# Patient Record
Sex: MALE | Race: WHITE | NOT HISPANIC OR LATINO | Employment: OTHER | URBAN - NONMETROPOLITAN AREA
[De-identification: names, ages, dates, MRNs, and addresses within clinical notes are randomized per-mention and may not be internally consistent; named-entity substitution may affect disease eponyms.]

---

## 2023-10-13 ENCOUNTER — TELEPHONE (OUTPATIENT)
Dept: GASTROENTEROLOGY | Facility: CLINIC | Age: 73
End: 2023-10-13

## 2023-10-13 NOTE — TELEPHONE ENCOUNTER
Hub staff attempted to follow warm transfer process and was unsuccessful     Caller: Endy Corbin    Relationship to patient: Self    Best call back number: [phone]    Patient is needing: PT STATES HE CALLED TO GET SCHEDULED, WAS TRANSFERRED TO A PHONE THAT NO ONE ANSWERED, HE WAS DIRECTED TO LVM. PT DOES NOT WISH TO LEAVE ANOTHER VM HE LEFT A VM 2 WKS AGO AND NEVER REC'D A RETURN CALL. PLEASE CONTACT PT TO SCHEDULE. PT STATES IF HE CANNOT RECEIVE A CALL BACK TO SCHEDULE TODAY, HE WILL FIND ANOTHER

## 2023-10-25 ENCOUNTER — ANESTHESIA (OUTPATIENT)
Dept: GASTROENTEROLOGY | Facility: HOSPITAL | Age: 73
End: 2023-10-25
Payer: MEDICARE

## 2023-10-25 ENCOUNTER — HOSPITAL ENCOUNTER (OUTPATIENT)
Facility: HOSPITAL | Age: 73
Setting detail: HOSPITAL OUTPATIENT SURGERY
Discharge: HOME | End: 2023-10-25
Attending: INTERNAL MEDICINE | Admitting: INTERNAL MEDICINE
Payer: MEDICARE

## 2023-10-25 ENCOUNTER — ANESTHESIA EVENT (OUTPATIENT)
Dept: GASTROENTEROLOGY | Facility: HOSPITAL | Age: 73
End: 2023-10-25
Payer: MEDICARE

## 2023-10-25 VITALS
WEIGHT: 252 LBS | HEART RATE: 66 BPM | BODY MASS INDEX: 39.55 KG/M2 | DIASTOLIC BLOOD PRESSURE: 82 MMHG | TEMPERATURE: 97.7 F | HEIGHT: 67 IN | RESPIRATION RATE: 20 BRPM | OXYGEN SATURATION: 95 % | SYSTOLIC BLOOD PRESSURE: 151 MMHG

## 2023-10-25 DIAGNOSIS — Z12.11 ENCOUNTER FOR SCREENING FOR MALIGNANT NEOPLASM OF COLON: ICD-10-CM

## 2023-10-25 LAB — GLUCOSE BLDC GLUCOMTR-MCNC: 134 MG/DL (ref 70–130)

## 2023-10-25 PROCEDURE — 25810000003 SODIUM CHLORIDE 0.9 % SOLUTION: Performed by: ANESTHESIOLOGY

## 2023-10-25 PROCEDURE — 45380 COLONOSCOPY AND BIOPSY: CPT | Performed by: INTERNAL MEDICINE

## 2023-10-25 PROCEDURE — 25010000002 PROPOFOL 10 MG/ML EMULSION

## 2023-10-25 PROCEDURE — 88305 TISSUE EXAM BY PATHOLOGIST: CPT | Performed by: INTERNAL MEDICINE

## 2023-10-25 PROCEDURE — 82948 REAGENT STRIP/BLOOD GLUCOSE: CPT

## 2023-10-25 PROCEDURE — 45385 COLONOSCOPY W/LESION REMOVAL: CPT | Performed by: INTERNAL MEDICINE

## 2023-10-25 RX ORDER — PROPOFOL 10 MG/ML
VIAL (ML) INTRAVENOUS AS NEEDED
Status: DISCONTINUED | OUTPATIENT
Start: 2023-10-25 | End: 2023-10-25 | Stop reason: SURG

## 2023-10-25 RX ORDER — SODIUM CHLORIDE 9 MG/ML
500 INJECTION, SOLUTION INTRAVENOUS CONTINUOUS PRN
Status: DISCONTINUED | OUTPATIENT
Start: 2023-10-25 | End: 2023-10-25 | Stop reason: HOSPADM

## 2023-10-25 RX ORDER — SODIUM CHLORIDE 0.9 % (FLUSH) 0.9 %
10 SYRINGE (ML) INJECTION EVERY 12 HOURS SCHEDULED
Status: CANCELLED | OUTPATIENT
Start: 2023-10-25

## 2023-10-25 RX ORDER — SODIUM CHLORIDE 0.9 % (FLUSH) 0.9 %
10 SYRINGE (ML) INJECTION AS NEEDED
Status: CANCELLED | OUTPATIENT
Start: 2023-10-25

## 2023-10-25 RX ORDER — LIDOCAINE HYDROCHLORIDE 20 MG/ML
INJECTION, SOLUTION EPIDURAL; INFILTRATION; INTRACAUDAL; PERINEURAL AS NEEDED
Status: DISCONTINUED | OUTPATIENT
Start: 2023-10-25 | End: 2023-10-25 | Stop reason: SURG

## 2023-10-25 RX ORDER — SODIUM CHLORIDE 9 MG/ML
40 INJECTION, SOLUTION INTRAVENOUS AS NEEDED
Status: CANCELLED | OUTPATIENT
Start: 2023-10-25

## 2023-10-25 RX ORDER — SODIUM CHLORIDE 9 MG/ML
100 INJECTION, SOLUTION INTRAVENOUS CONTINUOUS
Status: CANCELLED | OUTPATIENT
Start: 2023-10-25

## 2023-10-25 RX ADMIN — SODIUM CHLORIDE 500 ML: 9 INJECTION, SOLUTION INTRAVENOUS at 08:05

## 2023-10-25 RX ADMIN — PROPOFOL INJECTABLE EMULSION 250 MG: 10 INJECTION, EMULSION INTRAVENOUS at 08:54

## 2023-10-25 RX ADMIN — LIDOCAINE HYDROCHLORIDE 100 MG: 20 INJECTION, SOLUTION EPIDURAL; INFILTRATION; INTRACAUDAL; PERINEURAL at 08:54

## 2023-10-25 NOTE — ANESTHESIA POSTPROCEDURE EVALUATION
Patient: Amor Corbin    Procedure Summary       Date: 10/25/23 Room / Location: [facility] ENDOSCOPY 4 / [facility] ENDOSCOPY    Anesthesia Start: 0847 Anesthesia Stop:     Procedure: COLONOSCOPY WITH ANESTHESIA Diagnosis:       Encounter for screening for malignant neoplasm of colon      (Encounter for screening for malignant neoplasm of colon [Z12.11])    Surgeons: Brian Bray DO Provider: Yordan Tarango CRNA    Anesthesia Type: MAC ASA Status: 3            Anesthesia Type: MAC    Vitals  Vitals Value Taken Time   BP     Temp     Pulse 73 10/25/23 0914   Resp     SpO2 90 % 10/25/23 0914   Vitals shown include unfiled device data.        Post Anesthesia Care and Evaluation    Patient location during evaluation: PHASE II  Patient participation: complete - patient participated  Level of consciousness: awake and alert  Pain score: 0    Airway patency: patent  Anesthetic complications: No anesthetic complications  PONV Status: none  Cardiovascular status: acceptable  Respiratory status: acceptable  Hydration status: acceptable  No anesthesia care post op

## 2023-10-25 NOTE — H&P
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 His smoking use included cigarettes. He has never used smokeless tobacco. He reports that he does not drink alcohol and does not use drugs.    Medications:   Prior to Admission medications    Medication Sig Start Date End Date Taking? Authorizing Provider   ASPIRIN PO Take 81 mg by mouth Daily.  Patient not taking: Reported on 8/24/2023    Yohannes Robbins MD   atorvastatin (LIPITOR) 20 MG tablet Take 20 mg by mouth Every Night.  Patient not taking: Reported on 8/24/2023    Yohannes Robbins MD   atorvastatin (LIPITOR) 20 MG tablet Take 2 tablets by mouth Daily.    Yohannes Robbins MD   docusate sodium (COLACE) 100 MG capsule Take 1 capsule by mouth 2 (Two) Times a Day.    Yohannes Robbins MD   Fiber 500 MG capsule Take  by mouth.    Yohannes Robbins MD   lisinopril (PRINIVIL,ZESTRIL) 10 MG tablet Take 1 tablet by mouth Every Night.    Yohannes Robbins MD   metFORMIN (GLUCOPHAGE) 500 MG tablet Take 1 tablet by mouth 2 (Two) Times a Day With Meals.    Yohannes Robbins MD   Probiotic Product (Risaquad-2) capsule capsule Take 1 capsule by mouth Daily.    Yohannes Robbins MD   SITagliptin (JANUVIA) 50 MG tablet Take 1 tablet by mouth Daily.    Yohannes Robbins MD       Allergies:  Penicillins    ROS:    General:  "Weight stable  Resp: No SOA  Cardiovascular: No CP    Objective     Blood pressure 166/81, pulse 62, temperature 97.7 °F (36.5 °C), temperature source Temporal, resp. rate 20, height 170.2 cm (67\"), weight 114 kg (252 lb), SpO2 96%.    Physical Exam   Constitutional: Pt is oriented to person, place, and in no distress.   HENT: Mouth/Throat: Oropharynx is clear.   Cardiovascular: Normal rate, regular rhythm.    Pulmonary/Chest: Effort normal. No respiratory distress. No  wheezes.   Abdominal: Soft. Non-distended.  Skin: Skin is warm and dry.   Psychiatric: Mood, memory, affect and judgment appear normal.     Assessment & Plan     Diagnosis:  Screening    Anticipated Surgical Procedure:  C-scope    The risks, benefits, and alternatives of this procedure have been discussed with the patient or the responsible party- the patient understands and agrees to proceed.        "

## 2023-10-25 NOTE — ANESTHESIA PREPROCEDURE EVALUATION
Anesthesia Evaluation     Patient summary reviewed   no history of anesthetic complications:   NPO Solid Status: > 6 hours             Airway   Mallampati: II  Dental      Pulmonary    (-) sleep apnea, not a smoker, no home oxygen  Cardiovascular   Exercise tolerance: good (4-7 METS)    (+) hypertension  (-) past MI, cardiac stents, CABG      Neuro/Psych  (-) seizures, CVA  GI/Hepatic/Renal/Endo    (+) morbid obesity, diabetes mellitus    Musculoskeletal     Abdominal   (+) obese   Substance History      OB/GYN          Other                          Anesthesia Plan    ASA 3     MAC     intravenous induction     Anesthetic plan, risks, benefits, and alternatives have been provided, discussed and informed consent has been obtained with: patient.        CODE STATUS:

## 2023-10-26 LAB
CYTO UR: NORMAL
LAB AP CASE REPORT: NORMAL
Lab: NORMAL
PATH REPORT.FINAL DX SPEC: NORMAL
PATH REPORT.GROSS SPEC: NORMAL

## 2023-10-27 ENCOUNTER — TELEPHONE (OUTPATIENT)
Dept: GASTROENTEROLOGY | Facility: CLINIC | Age: 73
End: 2023-10-27
Payer: MEDICARE

## 2023-10-27 NOTE — TELEPHONE ENCOUNTER
----- Message from Brian Bray DO sent at 10/27/2023  6:39 AM CDT -----  Can u tell him I would like to go over his polyp bx results with him in the office next week  Wed at 12:30   Would be fine    ----- Message -----  From: Lab, Background User  Sent: 10/26/2023   3:35 PM CDT  To: Brian Bray DO

## 2023-11-01 ENCOUNTER — OFFICE VISIT (OUTPATIENT)
Dept: GASTROENTEROLOGY | Facility: CLINIC | Age: 73
End: 2023-11-01
Payer: MEDICARE

## 2023-11-01 VITALS
OXYGEN SATURATION: 97 % | TEMPERATURE: 97 F | DIASTOLIC BLOOD PRESSURE: 84 MMHG | WEIGHT: 258 LBS | HEIGHT: 68 IN | HEART RATE: 70 BPM | SYSTOLIC BLOOD PRESSURE: 148 MMHG | BODY MASS INDEX: 39.1 KG/M2

## 2023-11-01 DIAGNOSIS — D12.6 ADENOMATOUS POLYP OF COLON, UNSPECIFIED PART OF COLON: Primary | ICD-10-CM

## 2023-11-01 NOTE — H&P (VIEW-ONLY)
Chief Complaint   Patient presents with    Colonoscopy     10-25-23 colon here for biopsy results       PCP: Elie Blackman MD  REFER: No ref. provider found    Subjective     HPI           Discussed the path results with the pt about his prob Lipoma at 85 cm which showed serrated adenoma   We discussed the path results and he is willing to proceed with removal      Past Medical History:   Diagnosis Date    Diabetes mellitus     Diverticulitis     2022    Hyperlipidemia     Hypertension        Past Surgical History:   Procedure Laterality Date    COLONOSCOPY N/A 10/25/2023    Procedure: COLONOSCOPY WITH ANESTHESIA;  Surgeon: Brian Bray DO;  Location: [facility] ENDOSCOPY;  Service: Gastroenterology;  Laterality: N/A;  preop: screening  post op: diverticulosis; polyp  PCP: Elie Blackman MD    OTHER SURGICAL HISTORY      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      docusate sodium (COLACE) 100 MG capsule Take 1 capsule by mouth 2 (Two) Times a Day.      Fiber 500 MG capsule Take  by mouth.      lisinopril (PRINIVIL,ZESTRIL) 10 MG tablet Take 1 tablet by mouth Every Night.      metFORMIN (GLUCOPHAGE) 500 MG tablet Take 1 tablet by mouth 2 (Two) Times a Day With Meals.      Probiotic Product (Risaquad-2) capsule capsule Take 1 capsule by mouth Daily.      SITagliptin (JANUVIA) 50 MG tablet Take 1 tablet by mouth Daily.         Allergies   Allergen Reactions    Penicillins Hives       Social History     Socioeconomic History    Marital status:    Tobacco Use    Smoking status: Former     Types: Cigarettes    Smokeless tobacco: Never    Tobacco comments:     Quit smoking over 40 years ago   Vaping Use    Vaping Use: Never used  "  Substance and Sexual Activity    Alcohol use: Never    Drug use: Never       Review of Systems   Constitutional:  Negative for fever and unexpected weight change.   HENT:  Negative for trouble swallowing.    Respiratory:  Negative for shortness of breath.    Cardiovascular:  Negative for chest pain.   Gastrointestinal:  Negative for abdominal pain and anal bleeding.       Objective     Vitals:    11/01/23 1207   BP: 148/84   Pulse: 70   Temp: 97 °F (36.1 °C)   SpO2: 97%   Weight: 117 kg (258 lb)   Height: 172.7 cm (68\")     Body mass index is 39.23 kg/m².    Physical Exam  Constitutional:       Appearance: Normal appearance. He is well-developed.   Eyes:      General: No scleral icterus.  Cardiovascular:      Heart sounds: Normal heart sounds. No murmur heard.  Pulmonary:      Effort: Pulmonary effort is normal.   Abdominal:      General: Bowel sounds are normal. There is no distension.      Palpations: Abdomen is soft.      Tenderness: There is no abdominal tenderness. There is no guarding.   Skin:     General: Skin is warm and dry.      Coloration: Skin is not jaundiced.   Neurological:      Mental Status: He is alert.   Psychiatric:         Behavior: Behavior is cooperative.         Imaging Results (Most Recent)       None            Body mass index is 39.23 kg/m².    Assessment & Plan     Diagnoses and all orders for this visit:    1. Adenomatous polyp of colon, unspecified part of colon (Primary)  -     Case Request; Standing  -     Implement Anesthesia Orders Day of Procedure; Standing  -     Obtain Informed Consent; Standing  -     Case Request    Other orders  -     polyethylene glycol (GoLYTELY) 236 g solution; Take 3,785 mL by mouth 1 (One) Time for 1 dose. Take as directed  Dispense: 3350 mL; Refill: 0        COLONOSCOPY WITH ANESTHESIA (N/A)        Advised pt to stop use of NSAIDs, Fish Oil, and MV 5 days prior to procedure, per Dr Bray protocol.  Tylenol based products are ok to take.  Pt verbalized " understanding.        Brian Bray,   11/01/23          There are no Patient Instructions on file for this visit.

## 2023-11-01 NOTE — PROGRESS NOTES
Chief Complaint   Patient presents with    Colonoscopy     10-25-23 colon here for biopsy results       PCP: Elie Blackman MD  REFER: No ref. provider found    Subjective     HPI           Discussed the path results with the pt about his prob Lipoma at 85 cm which showed serrated adenoma   We discussed the path results and he is willing to proceed with removal      Past Medical History:   Diagnosis Date    Diabetes mellitus     Diverticulitis     2022    Hyperlipidemia     Hypertension        Past Surgical History:   Procedure Laterality Date    COLONOSCOPY N/A 10/25/2023    Procedure: COLONOSCOPY WITH ANESTHESIA;  Surgeon: Brian Bray DO;  Location: [facility] ENDOSCOPY;  Service: Gastroenterology;  Laterality: N/A;  preop: screening  post op: diverticulosis; polyp  PCP: Elie Blackman MD    OTHER SURGICAL HISTORY      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      docusate sodium (COLACE) 100 MG capsule Take 1 capsule by mouth 2 (Two) Times a Day.      Fiber 500 MG capsule Take  by mouth.      lisinopril (PRINIVIL,ZESTRIL) 10 MG tablet Take 1 tablet by mouth Every Night.      metFORMIN (GLUCOPHAGE) 500 MG tablet Take 1 tablet by mouth 2 (Two) Times a Day With Meals.      Probiotic Product (Risaquad-2) capsule capsule Take 1 capsule by mouth Daily.      SITagliptin (JANUVIA) 50 MG tablet Take 1 tablet by mouth Daily.         Allergies   Allergen Reactions    Penicillins Hives       Social History     Socioeconomic History    Marital status:    Tobacco Use    Smoking status: Former     Types: Cigarettes    Smokeless tobacco: Never    Tobacco comments:     Quit smoking over 40 years ago   Vaping Use    Vaping Use: Never used  "  Substance and Sexual Activity    Alcohol use: Never    Drug use: Never       Review of Systems   Constitutional:  Negative for fever and unexpected weight change.   HENT:  Negative for trouble swallowing.    Respiratory:  Negative for shortness of breath.    Cardiovascular:  Negative for chest pain.   Gastrointestinal:  Negative for abdominal pain and anal bleeding.       Objective     Vitals:    11/01/23 1207   BP: 148/84   Pulse: 70   Temp: 97 °F (36.1 °C)   SpO2: 97%   Weight: 117 kg (258 lb)   Height: 172.7 cm (68\")     Body mass index is 39.23 kg/m².    Physical Exam  Constitutional:       Appearance: Normal appearance. He is well-developed.   Eyes:      General: No scleral icterus.  Cardiovascular:      Heart sounds: Normal heart sounds. No murmur heard.  Pulmonary:      Effort: Pulmonary effort is normal.   Abdominal:      General: Bowel sounds are normal. There is no distension.      Palpations: Abdomen is soft.      Tenderness: There is no abdominal tenderness. There is no guarding.   Skin:     General: Skin is warm and dry.      Coloration: Skin is not jaundiced.   Neurological:      Mental Status: He is alert.   Psychiatric:         Behavior: Behavior is cooperative.         Imaging Results (Most Recent)       None            Body mass index is 39.23 kg/m².    Assessment & Plan     Diagnoses and all orders for this visit:    1. Adenomatous polyp of colon, unspecified part of colon (Primary)  -     Case Request; Standing  -     Implement Anesthesia Orders Day of Procedure; Standing  -     Obtain Informed Consent; Standing  -     Case Request    Other orders  -     polyethylene glycol (GoLYTELY) 236 g solution; Take 3,785 mL by mouth 1 (One) Time for 1 dose. Take as directed  Dispense: 3350 mL; Refill: 0        COLONOSCOPY WITH ANESTHESIA (N/A)        Advised pt to stop use of NSAIDs, Fish Oil, and MV 5 days prior to procedure, per Dr Bray protocol.  Tylenol based products are ok to take.  Pt verbalized " understanding.        Brian Bray,   11/01/23          There are no Patient Instructions on file for this visit.

## 2023-11-03 ENCOUNTER — TELEPHONE (OUTPATIENT)
Dept: GASTROENTEROLOGY | Facility: CLINIC | Age: 73
End: 2023-11-03
Payer: MEDICARE

## 2023-11-06 ENCOUNTER — ANESTHESIA EVENT (OUTPATIENT)
Dept: GASTROENTEROLOGY | Facility: HOSPITAL | Age: 73
End: 2023-11-06
Payer: MEDICARE

## 2023-11-06 ENCOUNTER — ANESTHESIA (OUTPATIENT)
Dept: GASTROENTEROLOGY | Facility: HOSPITAL | Age: 73
End: 2023-11-06
Payer: MEDICARE

## 2023-11-06 ENCOUNTER — HOSPITAL ENCOUNTER (OUTPATIENT)
Facility: HOSPITAL | Age: 73
Setting detail: HOSPITAL OUTPATIENT SURGERY
Discharge: HOME | End: 2023-11-06
Attending: INTERNAL MEDICINE | Admitting: INTERNAL MEDICINE
Payer: MEDICARE

## 2023-11-06 VITALS
DIASTOLIC BLOOD PRESSURE: 88 MMHG | WEIGHT: 252 LBS | TEMPERATURE: 96.8 F | HEART RATE: 54 BPM | RESPIRATION RATE: 20 BRPM | OXYGEN SATURATION: 96 % | BODY MASS INDEX: 38.19 KG/M2 | HEIGHT: 68 IN | SYSTOLIC BLOOD PRESSURE: 156 MMHG

## 2023-11-06 DIAGNOSIS — D12.6 ADENOMATOUS POLYP OF COLON, UNSPECIFIED PART OF COLON: ICD-10-CM

## 2023-11-06 LAB — GLUCOSE BLDC GLUCOMTR-MCNC: 132 MG/DL (ref 70–130)

## 2023-11-06 PROCEDURE — 88305 TISSUE EXAM BY PATHOLOGIST: CPT | Performed by: INTERNAL MEDICINE

## 2023-11-06 PROCEDURE — 82948 REAGENT STRIP/BLOOD GLUCOSE: CPT

## 2023-11-06 PROCEDURE — 25010000002 PROPOFOL 10 MG/ML EMULSION: Performed by: NURSE ANESTHETIST, CERTIFIED REGISTERED

## 2023-11-06 PROCEDURE — 25810000003 SODIUM CHLORIDE 0.9 % SOLUTION: Performed by: ANESTHESIOLOGY

## 2023-11-06 DEVICE — DEV CLIP HEMO RESOLUTION360/ULTR 235CM 17MM STRL: Type: IMPLANTABLE DEVICE | Site: ASCENDING COLON | Status: FUNCTIONAL

## 2023-11-06 RX ORDER — LIDOCAINE HYDROCHLORIDE 10 MG/ML
0.5 INJECTION, SOLUTION EPIDURAL; INFILTRATION; INTRACAUDAL; PERINEURAL ONCE AS NEEDED
Status: DISCONTINUED | OUTPATIENT
Start: 2023-11-06 | End: 2023-11-06 | Stop reason: HOSPADM

## 2023-11-06 RX ORDER — SODIUM CHLORIDE 9 MG/ML
500 INJECTION, SOLUTION INTRAVENOUS CONTINUOUS PRN
Status: DISCONTINUED | OUTPATIENT
Start: 2023-11-06 | End: 2023-11-06 | Stop reason: HOSPADM

## 2023-11-06 RX ORDER — SODIUM CHLORIDE 0.9 % (FLUSH) 0.9 %
10 SYRINGE (ML) INJECTION AS NEEDED
Status: DISCONTINUED | OUTPATIENT
Start: 2023-11-06 | End: 2023-11-06 | Stop reason: HOSPADM

## 2023-11-06 RX ORDER — PROPOFOL 10 MG/ML
VIAL (ML) INTRAVENOUS AS NEEDED
Status: DISCONTINUED | OUTPATIENT
Start: 2023-11-06 | End: 2023-11-06 | Stop reason: SURG

## 2023-11-06 RX ORDER — LIDOCAINE HYDROCHLORIDE 20 MG/ML
INJECTION, SOLUTION EPIDURAL; INFILTRATION; INTRACAUDAL; PERINEURAL AS NEEDED
Status: DISCONTINUED | OUTPATIENT
Start: 2023-11-06 | End: 2023-11-06 | Stop reason: SURG

## 2023-11-06 RX ADMIN — SODIUM CHLORIDE 500 ML: 9 INJECTION, SOLUTION INTRAVENOUS at 07:51

## 2023-11-06 RX ADMIN — PROPOFOL INJECTABLE EMULSION 50 MG: 10 INJECTION, EMULSION INTRAVENOUS at 08:34

## 2023-11-06 RX ADMIN — PROPOFOL INJECTABLE EMULSION 80 MG: 10 INJECTION, EMULSION INTRAVENOUS at 08:31

## 2023-11-06 RX ADMIN — PROPOFOL INJECTABLE EMULSION 70 MG: 10 INJECTION, EMULSION INTRAVENOUS at 08:28

## 2023-11-06 RX ADMIN — LIDOCAINE HYDROCHLORIDE 100 MG: 20 INJECTION, SOLUTION EPIDURAL; INFILTRATION; INTRACAUDAL; PERINEURAL at 08:28

## 2023-11-06 RX ADMIN — PROPOFOL INJECTABLE EMULSION 50 MG: 10 INJECTION, EMULSION INTRAVENOUS at 08:38

## 2023-11-06 NOTE — DISCHARGE INSTRUCTIONS
You have had a clip placed in your colon to help prevent bleeding. It will come off on its own within 2 weeks. If you see it, flush it. DO NOT RETRIEVE IT. No MRI for 2 weeks unless approved by your physician. Keep your clip card for 2 weeks.

## 2023-11-10 ENCOUNTER — TELEPHONE (OUTPATIENT)
Dept: GASTROENTEROLOGY | Facility: CLINIC | Age: 73
End: 2023-11-10
Payer: MEDICARE

## 2023-11-10 NOTE — TELEPHONE ENCOUNTER
----- Message from Brian Bray DO sent at 11/8/2023  7:09 AM CST -----  Please let him know his polyp was benign  2 yr recall    ----- Message -----  From: Lab, Background User  Sent: 11/7/2023   6:11 PM CST  To: Brian Bray DO

## 2024-12-16 ENCOUNTER — OFFICE VISIT (OUTPATIENT)
Dept: CARDIOLOGY | Facility: CLINIC | Age: 74
End: 2024-12-16
Payer: MEDICARE

## 2024-12-16 VITALS
OXYGEN SATURATION: 98 % | HEART RATE: 52 BPM | DIASTOLIC BLOOD PRESSURE: 82 MMHG | BODY MASS INDEX: 39.56 KG/M2 | HEIGHT: 68 IN | WEIGHT: 261 LBS | SYSTOLIC BLOOD PRESSURE: 140 MMHG

## 2024-12-16 DIAGNOSIS — I10 PRIMARY HYPERTENSION: ICD-10-CM

## 2024-12-16 DIAGNOSIS — R06.09 DOE (DYSPNEA ON EXERTION): ICD-10-CM

## 2024-12-16 DIAGNOSIS — E78.2 MIXED HYPERLIPIDEMIA: ICD-10-CM

## 2024-12-16 DIAGNOSIS — I20.89 OTHER FORMS OF ANGINA PECTORIS: Primary | ICD-10-CM

## 2024-12-16 RX ORDER — GLIMEPIRIDE 4 MG/1
4 TABLET ORAL
COMMUNITY

## 2024-12-16 RX ORDER — AMLODIPINE BESYLATE 10 MG/1
10 TABLET ORAL DAILY
COMMUNITY

## 2024-12-18 ENCOUNTER — HOSPITAL ENCOUNTER (OUTPATIENT)
Dept: CARDIOLOGY | Facility: HOSPITAL | Age: 74
Discharge: HOME | End: 2024-12-18
Payer: MEDICARE

## 2024-12-18 VITALS
HEART RATE: 56 BPM | BODY MASS INDEX: 39.56 KG/M2 | WEIGHT: 261 LBS | DIASTOLIC BLOOD PRESSURE: 71 MMHG | HEIGHT: 68 IN | SYSTOLIC BLOOD PRESSURE: 136 MMHG

## 2024-12-18 VITALS
BODY MASS INDEX: 39.56 KG/M2 | SYSTOLIC BLOOD PRESSURE: 140 MMHG | HEIGHT: 68 IN | DIASTOLIC BLOOD PRESSURE: 82 MMHG | WEIGHT: 261 LBS

## 2024-12-18 DIAGNOSIS — I20.89 OTHER FORMS OF ANGINA PECTORIS: ICD-10-CM

## 2024-12-18 DIAGNOSIS — R06.09 DOE (DYSPNEA ON EXERTION): ICD-10-CM

## 2024-12-18 LAB
BH CV ECHO MEAS - AO MAX PG: 8.9 MMHG
BH CV ECHO MEAS - AO MEAN PG: 4.8 MMHG
BH CV ECHO MEAS - AO ROOT DIAM: 3.4 CM
BH CV ECHO MEAS - AO V2 MAX: 148.8 CM/SEC
BH CV ECHO MEAS - AO V2 VTI: 33.5 CM
BH CV ECHO MEAS - AVA(I,D): 2.8 CM2
BH CV ECHO MEAS - EDV(CUBED): 128 ML
BH CV ECHO MEAS - EDV(MOD-SP4): 128.4 ML
BH CV ECHO MEAS - EF(MOD-SP4): 65.9 %
BH CV ECHO MEAS - ESV(CUBED): 24.9 ML
BH CV ECHO MEAS - ESV(MOD-SP4): 43.8 ML
BH CV ECHO MEAS - FS: 42.1 %
BH CV ECHO MEAS - IVS/LVPW: 1.09 CM
BH CV ECHO MEAS - IVSD: 1.25 CM
BH CV ECHO MEAS - LA DIMENSION: 3.6 CM
BH CV ECHO MEAS - LAT PEAK E' VEL: 8 CM/SEC
BH CV ECHO MEAS - LV DIASTOLIC VOL/BSA (35-75): 56.1 CM2
BH CV ECHO MEAS - LV MASS(C)D: 236.5 GRAMS
BH CV ECHO MEAS - LV MAX PG: 4.8 MMHG
BH CV ECHO MEAS - LV MEAN PG: 2.6 MMHG
BH CV ECHO MEAS - LV SYSTOLIC VOL/BSA (12-30): 19.1 CM2
BH CV ECHO MEAS - LV V1 MAX: 109.1 CM/SEC
BH CV ECHO MEAS - LV V1 VTI: 25.3 CM
BH CV ECHO MEAS - LVIDD: 5 CM
BH CV ECHO MEAS - LVIDS: 2.9 CM
BH CV ECHO MEAS - LVOT AREA: 3.7 CM2
BH CV ECHO MEAS - LVOT DIAM: 2.16 CM
BH CV ECHO MEAS - LVPWD: 1.15 CM
BH CV ECHO MEAS - MED PEAK E' VEL: 11 CM/SEC
BH CV ECHO MEAS - MV A MAX VEL: 97.7 CM/SEC
BH CV ECHO MEAS - MV DEC SLOPE: 477 CM/SEC2
BH CV ECHO MEAS - MV DEC TIME: 0.2 SEC
BH CV ECHO MEAS - MV E MAX VEL: 93.5 CM/SEC
BH CV ECHO MEAS - MV E/A: 0.96
BH CV ECHO MEAS - RAP SYSTOLE: 3 MMHG
BH CV ECHO MEAS - RVSP: 17.5 MMHG
BH CV ECHO MEAS - SV(LVOT): 93 ML
BH CV ECHO MEAS - SV(MOD-SP4): 84.6 ML
BH CV ECHO MEAS - SVI(LVOT): 40.6 ML/M2
BH CV ECHO MEAS - SVI(MOD-SP4): 37 ML/M2
BH CV ECHO MEAS - TR MAX PG: 14.5 MMHG
BH CV ECHO MEAS - TR MAX VEL: 190.1 CM/SEC
BH CV ECHO MEASUREMENTS AVERAGE E/E' RATIO: 9.84
BH CV STRESS BP STAGE 1: NORMAL
BH CV STRESS BP STAGE 2: NORMAL
BH CV STRESS BP STAGE 3: NORMAL
BH CV STRESS DOB - ATROPINE STAGE 3: 0.3
BH CV STRESS DOSE DOBUTAMINE STAGE 1: 10
BH CV STRESS DOSE DOBUTAMINE STAGE 2: 20
BH CV STRESS DOSE DOBUTAMINE STAGE 3: 30
BH CV STRESS DURATION MIN STAGE 1: 3
BH CV STRESS DURATION MIN STAGE 2: 3
BH CV STRESS DURATION MIN STAGE 3: 2
BH CV STRESS DURATION SEC STAGE 1: 0
BH CV STRESS DURATION SEC STAGE 2: 0
BH CV STRESS DURATION SEC STAGE 3: 42
BH CV STRESS HR STAGE 1: 67
BH CV STRESS HR STAGE 2: 99
BH CV STRESS HR STAGE 3: 130
BH CV STRESS PROTOCOL 1: NORMAL
BH CV STRESS RECOVERY BP: NORMAL MMHG
BH CV STRESS RECOVERY HR: 93 BPM
BH CV STRESS STAGE 1: 1
BH CV STRESS STAGE 2: 2
BH CV STRESS STAGE 3: 3
LEFT ATRIUM VOLUME INDEX: 29.9 ML/M2
LEFT ATRIUM VOLUME: 68 ML
MAXIMAL PREDICTED HEART RATE: 147 BPM
PERCENT MAX PREDICTED HR: 88.44 %
STRESS BASELINE BP: NORMAL MMHG
STRESS BASELINE HR: 59 BPM
STRESS PERCENT HR: 104 %
STRESS POST EXERCISE DUR MIN: 8 MIN
STRESS POST EXERCISE DUR SEC: 42 SEC
STRESS POST PEAK BP: NORMAL MMHG
STRESS POST PEAK HR: 130 BPM
STRESS TARGET HR: 125 BPM

## 2024-12-18 PROCEDURE — 93017 CV STRESS TEST TRACING ONLY: CPT

## 2024-12-18 PROCEDURE — 25510000001 PERFLUTREN 6.52 MG/ML SUSPENSION: Performed by: EMERGENCY MEDICINE

## 2024-12-18 PROCEDURE — 25010000002 DOBUTAMINE PER 250 MG: Performed by: EMERGENCY MEDICINE

## 2024-12-18 PROCEDURE — 93350 STRESS TTE ONLY: CPT

## 2024-12-18 PROCEDURE — 25010000002 ATROPINE SULFATE 0.1 MG/ML: Performed by: EMERGENCY MEDICINE

## 2024-12-18 PROCEDURE — 93306 TTE W/DOPPLER COMPLETE: CPT

## 2024-12-18 RX ORDER — METOPROLOL TARTRATE 1 MG/ML
5 INJECTION, SOLUTION INTRAVENOUS ONCE
Status: DISCONTINUED | OUTPATIENT
Start: 2024-12-18 | End: 2024-12-19 | Stop reason: HOSPADM

## 2024-12-18 RX ORDER — DOBUTAMINE HYDROCHLORIDE 100 MG/100ML
10-50 INJECTION INTRAVENOUS CONTINUOUS
Status: DISCONTINUED | OUTPATIENT
Start: 2024-12-18 | End: 2024-12-19 | Stop reason: HOSPADM

## 2024-12-18 RX ADMIN — PERFLUTREN 8.48 MG: 6.52 INJECTION, SUSPENSION INTRAVENOUS at 10:42

## 2024-12-18 RX ADMIN — ATROPINE SULFATE 0.3 MG: 0.1 INJECTION INTRAVENOUS at 11:03

## 2024-12-18 RX ADMIN — DOBUTAMINE HYDROCHLORIDE 10 MCG/KG/MIN: 100 INJECTION INTRAVENOUS at 10:42

## 2024-12-28 PROBLEM — I10 PRIMARY HYPERTENSION: Status: ACTIVE | Noted: 2024-12-28

## 2024-12-28 PROBLEM — R06.09 DOE (DYSPNEA ON EXERTION): Status: ACTIVE | Noted: 2024-12-28

## 2024-12-28 PROBLEM — I20.89 OTHER FORMS OF ANGINA PECTORIS: Status: ACTIVE | Noted: 2024-12-28

## 2024-12-28 PROBLEM — E78.2 MIXED HYPERLIPIDEMIA: Status: ACTIVE | Noted: 2024-12-28

## 2024-12-28 NOTE — PROGRESS NOTES
[facility] - CARDIOLOGY  New Patient Initial Outpatient Evaulation    Primary Care Physician: Elie Blackman MD    Subjective     Chief Complaint   Patient presents with    PRECORDIAL PAIN     NEW PT         History of Present Illness  History of Present Illness  The patient presents for evaluation of shortness of breath and fatigue.    He reports experiencing dyspnea and fatigue, particularly during light household activities. These symptoms have been present for approximately one month. He also notes a decrease in his overall well-being compared to his usual state. He experiences mild chest tightness when exerting himself but has no known cardiac issues. He has not undergone any cardiac tests other than EKGs. He recalls an EKG performed at Dr. Floyd's office approximately 40 years ago, which was intended as a baseline measurement. He has previously undergone a stress test under the supervision of Dr. Le, which he successfully completed. However, he expresses uncertainty about his ability to walk on a treadmill for 5 to 6 minutes due to a decrease in his walking activity. He reports no dizziness, lightheadedness, syncope, or palpitations. He discontinued baby aspirin due to excessive bleeding from minor scratches sustained during his work as a .    He is diabetic and sees Dr. Blackman every 4 months. His A1c is up to 7.6. He is on Amaryl, Januvia, and metformin. He went up on the Januvia this last time from 50 to 100.    He has high cholesterol. He is on Lipitor.    He has high blood pressure. He is on amlodipine 10 mg and lisinopril 10 mg.    SOCIAL HISTORY  He used to be a smoker 50 years ago. He was an automobile and .    MEDICATIONS  amlodipine, Lipitor, lisinopril, Amaryl, Januvia, metformin    Review of Systems   Constitutional: Positive for malaise/fatigue. Negative for diaphoresis and fever.   HENT:  Negative for congestion.    Eyes:  Negative for vision loss in left  eye and vision loss in right eye.   Cardiovascular:  Negative for chest pain, claudication, dyspnea on exertion, irregular heartbeat, leg swelling, orthopnea, palpitations and syncope.   Respiratory:  Positive for shortness of breath. Negative for cough and wheezing.    Hematologic/Lymphatic: Negative for adenopathy.   Skin:  Negative for rash.   Musculoskeletal:  Negative for joint pain and joint swelling.   Gastrointestinal:  Negative for abdominal pain, diarrhea, nausea and vomiting.   Neurological:  Negative for excessive daytime sleepiness, dizziness, focal weakness, light-headedness, numbness and weakness.   Psychiatric/Behavioral:  Negative for depression. The patient does not have insomnia.         Otherwise complete ROS reviewed and negative except as mentioned in the HPI.      Past Medical History:   Past Medical History:   Diagnosis Date    Diabetes mellitus     Diverticulitis     2022    Hyperlipidemia     Hypertension        Past Surgical History:  Past Surgical History:   Procedure Laterality Date    COLONOSCOPY N/A 10/25/2023    Procedure: COLONOSCOPY WITH ANESTHESIA;  Surgeon: Brian Bray DO;  Location: [facility] ENDOSCOPY;  Service: Gastroenterology;  Laterality: N/A;  preop: screening  post op: diverticulosis; polyp  PCP: Garrick Blackman MD    COLONOSCOPY N/A 11/06/2023    Procedure: COLONOSCOPY WITH ANESTHESIA;  Surgeon: Brian Bray DO;  Location: [facility] ENDOSCOPY;  Service: Gastroenterology;  Laterality: N/A;  preop; hx of polyps  postop diverticulosis ; polyps   pcpc garrick blackman    OTHER SURGICAL HISTORY      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    Social History:  reports that he has quit smoking. His smoking use included cigarettes. He has never used smokeless tobacco. He reports that he does not drink  "alcohol and does not use drugs.    Medications:  Prior to Admission medications    Medication Sig Start Date End Date Taking? Authorizing Provider   amLODIPine (NORVASC) 10 MG tablet Take 1 tablet by mouth Daily.   Yes Yohannes Robbins MD   atorvastatin (LIPITOR) 20 MG tablet Take 2 tablets by mouth Daily.   Yes Yohannes Robbins MD   glimepiride (AMARYL) 4 MG tablet Take 1 tablet by mouth Every Morning Before Breakfast.   Yes Yohannes Robbins MD   lisinopril (PRINIVIL,ZESTRIL) 10 MG tablet Take 1 tablet by mouth Every Night.   Yes Yohannes Robbins MD   metFORMIN (GLUCOPHAGE) 500 MG tablet Take 1 tablet by mouth 2 (Two) Times a Day With Meals.   Yes Yohannes Robbins MD   SITagliptin (JANUVIA) 50 MG tablet Take 1 tablet by mouth Daily.   Yes Yohannes Robbins MD     Allergies:  Allergies   Allergen Reactions    Penicillins Hives       Objective     Vital Signs: /82   Pulse 52   Ht 172.7 cm (68\")   Wt 118 kg (261 lb)   SpO2 98%   BMI 39.68 kg/m²     Vitals and nursing note reviewed.   Constitutional:       Appearance: Normal and healthy appearance. Well-developed and not in distress.   Eyes:      Extraocular Movements: Extraocular movements intact.      Pupils: Pupils are equal, round, and reactive to light.   HENT:      Head: Normocephalic and atraumatic.    Mouth/Throat:      Pharynx: Oropharynx is clear.   Neck:      Vascular: JVD normal.      Trachea: Trachea normal.   Pulmonary:      Effort: Pulmonary effort is normal.      Breath sounds: Normal breath sounds. No wheezing. No rhonchi. No rales.   Cardiovascular:      PMI at left midclavicular line. Normal rate. Regular rhythm. Normal S1. Normal S2.       Murmurs: There is a grade 2/6 systolic murmur.      No gallop.  No click. No rub.   Pulses:     Dorsalis pedis: 2+ bilaterally.     Posterior tibial: 2+ bilaterally.  Abdominal:      General: Bowel sounds are normal.      Palpations: Abdomen is soft.      Tenderness: There " "is no abdominal tenderness.   Musculoskeletal: Normal range of motion.      Cervical back: Normal range of motion and neck supple. Skin:     General: Skin is warm and dry.      Capillary Refill: Capillary refill takes less than 2 seconds.   Feet:      Right foot:      Skin integrity: Skin integrity normal.      Left foot:      Skin integrity: Skin integrity normal.   Neurological:      Mental Status: Alert and oriented to person, place and time.      Sensory: Sensation is intact.      Motor: Motor function is intact.      Coordination: Coordination is intact.   Psychiatric:         Speech: Speech normal.         Behavior: Behavior is cooperative.       Physical Exam  Vital Signs  Blood pressure and heart rate are slightly elevated.    Results Reviewed:    Procedures    Results  Laboratory Studies  A1c is up to 7.6.    No results found for: \"CHOL\", \"TRIG\", \"HDL\", \"VLDL\", \"LDLHDL\"  No results found for: \"HGBA1C\"    Assessment / Plan        Problem List Items Addressed This Visit    None  Visit Diagnoses       Other forms of angina pectoris    -  Primary    Relevant Medications    amLODIPine (NORVASC) 10 MG tablet    Other Relevant Orders    Adult Stress Echo W/ Cont or Stress Agent if Necessary Per Protocol (Completed)    WHIPPLE (dyspnea on exertion)        Relevant Orders    Adult Transthoracic Echo Complete W/ Cont if Necessary Per Protocol (Completed)          Assessment & Plan  1. Dyspnea and fatigue.  Given his risk factors and symptomatology, there is a potential concern for a significant cardiac blockage. A dobutamine stress echocardiogram will be conducted to further evaluate his cardiac function. He will be contacted with the results of these tests once they are available. No changes to his current medication regimen are deemed necessary at this time.    2. Diabetes mellitus.  His A1c has increased to 7.6, prompting an increase in his Januvia dosage from 50 mg to 100 mg. He will continue his current medications: " Amaryl, Januvia, and metformin.    3. Hypercholesterolemia.  He is currently on Lipitor for cholesterol management. No changes to his medication regimen are necessary at this time.    4. Hypertension.  His blood pressure and heart rate are slightly elevated. He is currently on amlodipine 10 mg and lisinopril 10 mg. No changes to his medication regimen are necessary at this time.    Follow-up  The patient will follow up in 1 month.      Transcribed from ambient dictation for Jamal Walker DO by Jamal Walker DO.  12/28/24   16:54 CST    Patient or patient representative verbalized consent for the use of Ambient Listening during the visit with  Jamal Walker DO for chart documentation. 12/28/2024  16:56 CST

## 2025-01-22 ENCOUNTER — OFFICE VISIT (OUTPATIENT)
Dept: CARDIOLOGY | Facility: CLINIC | Age: 75
End: 2025-01-22
Payer: MEDICARE

## 2025-01-22 VITALS
WEIGHT: 265 LBS | DIASTOLIC BLOOD PRESSURE: 72 MMHG | HEART RATE: 66 BPM | BODY MASS INDEX: 40.16 KG/M2 | SYSTOLIC BLOOD PRESSURE: 154 MMHG | OXYGEN SATURATION: 99 % | HEIGHT: 68 IN

## 2025-01-22 DIAGNOSIS — Z87.891 FORMER SMOKER: ICD-10-CM

## 2025-01-22 DIAGNOSIS — E78.2 MIXED HYPERLIPIDEMIA: ICD-10-CM

## 2025-01-22 DIAGNOSIS — I10 PRIMARY HYPERTENSION: ICD-10-CM

## 2025-01-22 DIAGNOSIS — R06.09 DOE (DYSPNEA ON EXERTION): Primary | ICD-10-CM

## 2025-01-22 PROCEDURE — 3078F DIAST BP <80 MM HG: CPT | Performed by: EMERGENCY MEDICINE

## 2025-01-22 PROCEDURE — 99213 OFFICE O/P EST LOW 20 MIN: CPT | Performed by: EMERGENCY MEDICINE

## 2025-01-22 PROCEDURE — 3077F SYST BP >= 140 MM HG: CPT | Performed by: EMERGENCY MEDICINE

## 2025-02-05 ENCOUNTER — OFFICE VISIT (OUTPATIENT)
Dept: PULMONOLOGY | Facility: CLINIC | Age: 75
End: 2025-02-05
Payer: MEDICARE

## 2025-02-05 VITALS
HEIGHT: 68 IN | BODY MASS INDEX: 40.01 KG/M2 | RESPIRATION RATE: 18 BRPM | HEART RATE: 68 BPM | DIASTOLIC BLOOD PRESSURE: 76 MMHG | WEIGHT: 264 LBS | OXYGEN SATURATION: 97 % | SYSTOLIC BLOOD PRESSURE: 140 MMHG

## 2025-02-05 DIAGNOSIS — T75.89XA ENVIRONMENTAL EXPOSURE: ICD-10-CM

## 2025-02-05 DIAGNOSIS — Z87.891 FORMER SMOKER: ICD-10-CM

## 2025-02-05 DIAGNOSIS — R05.3 CHRONIC COUGH: ICD-10-CM

## 2025-02-05 DIAGNOSIS — R06.09 CHRONIC DYSPNEA: Primary | ICD-10-CM

## 2025-02-05 NOTE — LETTER
2025     Jamal Walker DO  [address]  [facility] [address]  [facility] [address]    Patient: Amor Corbin   YOB: 1950   Date of Visit: 2025     Dear Dr. Aaron DO:    Thank you for referring Amor Corbin to me for evaluation. Below are the relevant portions of my assessment and plan of care.    If you have questions, please do not hesitate to call me. I look forward to following Amor along with you.         Sincerely,        Brently Primo Walker DO        CC: No Recipients      Progress Notes:    Clinic Note - New Patient            NAME: Amor Corbin  MRN: [id number]  AGE: 74 y.o.            : 1950  PRIMARY CARE PROVIDER: Elie Blackman MD               Reason for Visit:  Amor Corbin presents to clinic today as a new patient for the evaluation of dyspnea.    History of Present Illness:  Mr. Corbin is a 74-year-old male who presents to clinic today as a new patient for evaluation of chronic dyspnea.  Past medical history includes hypertension, diabetes, minimal smoking history.    Patient was referred to pulmonology by his cardiologist due to chronic dyspnea.  He recently underwent a cardiac workup including echo and stress echo.  His stress echo was read as low risk.  Therefore has been referred to pulmonology for evaluation.  He notes dyspnea which started about 2 months ago.  His dyspnea is worse with exertion such as walking longer distances or ascending a flight of stairs.  He is also noted a new nagging dry cough.  Denies any associated wheezing.  Due to the symptoms he has been exerting himself less over the past 2 months.    Denies any prior diagnosis of lung disease.  No inhalers or supplemental oxygen at home.  Less than 10-pack-year history of smoking, quit over 50 years ago.  No personal or family history of lung cancer or VTE.  Lives at home which is clean and dry.  No pets.  He does have occasional seasonal allergies which he uses as  needed Benadryl.  He also notes frequent postnasal drip.    Review of Systems:  A full 12-point review of systems was performed and was negative except as documented in the HPI.               Social History:  Smoking: Less than 10-pack-year history, quit 50 years ago  Occupation: Multiple factory jobs and   Positive history of exposure to industrial dusts, fumes, or asbestos.  Pets: None  Recent travel: None recent  Previous exposure to persons with tuberculosis: None known            Physical Exam:  General: No acute distress, cooperative.  Head: Atraumatic, normocephalic, normal inspection.  Eyes: EOMI, normal inspection.  ENT: Mucous membranes moist, normal inspection. No thrush.  Heart: RRR, no murmur  Lungs: Clear to auscultation, no wheezing  MSK: No edema or clubbing  GI/abdominal: Soft, nontender.  Neurologic: Alert, oriented.  Cranial nerves II through XII grossly intact.      Imaging Review:  I personally reviewed the CT abdomen and pelvis done in 2021:  Lung bases were clear, though he does appear to have thickening of the left lower pleura.      Pulmonary Functions Testing Results:  None available for review            Problem List:  Problem List Items Addressed This Visit       Chronic dyspnea - Primary    Relevant Orders    Spirometry with Diffusion Capacity & Lung Volumes    CT Chest Without Contrast    Environmental exposure    Chronic cough    Former smoker         Pulmonary Assessment:  Patient is a 74-year-old male with history of chronic cough and dyspnea which has been present for the past 2 months.  Has had a negative cardiac workup.  Will evaluate with PFTs.  Will also like to obtain CT chest without contrast given his occupational history and pleural thickening on his CT abdomen pelvis from 2021.  Prevnar 20 in clinic today.      Plan:   -PFTs prior to follow-up  -CT chest without contrast  -Prevnar 20 vaccine in clinic today           Follow Up:  6 weeks         Thank you  Garrick Blackman MD for this referral and allowing me to participate in this patient's care.           Past Medical History:   Past Medical History:   Diagnosis Date   • Diabetes mellitus    • Diverticulitis     2022   • Hyperlipidemia    • Hypertension          Past Surgical History:   Past Surgical History:   Procedure Laterality Date   • COLONOSCOPY N/A 10/25/2023    Procedure: COLONOSCOPY WITH ANESTHESIA;  Surgeon: Brian Bray DO;  Location: [facility] ENDOSCOPY;  Service: Gastroenterology;  Laterality: N/A;  preop: screening  post op: diverticulosis; polyp  PCP: Garrick Blackman MD   • COLONOSCOPY N/A 11/06/2023    Procedure: COLONOSCOPY WITH ANESTHESIA;  Surgeon: Brian Bray DO;  Location:  [facility] ENDOSCOPY;  Service: Gastroenterology;  Laterality: N/A;  preop; hx of polyps  postop diverticulosis ; polyps   pcpc garrick blackman   • OTHER SURGICAL HISTORY      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 Authorizing Provider   amLODIPine (NORVASC) 10 MG tablet Take 1 tablet by mouth Daily.   Yes ProviderYohannes MD   atorvastatin (LIPITOR) 20 MG tablet Take 2 tablets by mouth Daily.   Yes ProviderYohannes MD   glimepiride (AMARYL) 4 MG tablet Take 1 tablet by mouth Every Morning Before Breakfast.   Yes ProviderYohannes MD   lisinopril (PRINIVIL,ZESTRIL) 10 MG tablet Take 1 tablet by mouth Every Night.   Yes ProviderYohannes MD   metFORMIN (GLUCOPHAGE) 500 MG tablet Take 1 tablet by mouth 2 (Two) Times a Day With Meals.   Yes ProviderYohannes MD   SITagliptin (JANUVIA) 50 MG tablet Take 1 tablet by mouth  "Daily.  Patient taking differently: Take 2 tablets by mouth Daily.   Yes Provider, Historical, MD         Allergies:   Penicillins      Results Review:             Visit Vitals  /76   Pulse 68   Resp 18   Ht 172.7 cm (67.99\")   Wt 120 kg (264 lb)   SpO2 97% Comment: R/A   BMI 40.15 kg/m²                  Hermelinda Walker DO  Pulmonary/Critical Care  2/5/2025  16:14 CST  "

## 2025-02-16 PROCEDURE — 93000 ELECTROCARDIOGRAM COMPLETE: CPT | Performed by: EMERGENCY MEDICINE

## 2025-02-16 NOTE — PROGRESS NOTES
Subjective:     Encounter Date:01/22/2025      Patient ID: Amor Corbin is a 74 y.o. male.    Chief Complaint:  History of Present Illness  History of Present Illness  The patient presents for evaluation of shortness of breath.    He reports experiencing dyspnea during physical exertion, a symptom that has been particularly noticeable throughout the winter season. He also mentions a persistent cough. He has not undergone any pulmonary imaging studies to date. He has a history of tobacco use, specifically cigars, but ceased this habit approximately 40 years ago. 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 He recalls that it took him several months to rid himself of the chemical odor. He also has a history of agricultural work, which involved handling of chemicals. He speculates that his fatigue may be a consequence of his dyspnea.    Supplemental Information  He experienced an episode of chest discomfort recently, which he attributes to heartburn.    SOCIAL HISTORY  The patient quit smoking cigars over 40 years ago. 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    The following portions of the patient's history were reviewed and updated as appropriate: allergies, current medications, past family history, past medical history, past social history, past surgical history, and problem list.    Review of Systems   Constitutional: Negative for diaphoresis, fever and malaise/fatigue.   HENT:  Negative for congestion.    Eyes:  Negative for vision loss in left eye and vision loss in right eye.   Cardiovascular:  Positive for dyspnea on exertion. Negative for chest pain, claudication, irregular heartbeat, leg swelling, orthopnea, palpitations and syncope.   Respiratory:  Positive for cough and shortness of breath. Negative for wheezing.    Hematologic/Lymphatic:  Negative for adenopathy.   Skin:  Negative for rash.   Musculoskeletal:  Negative for joint pain and joint swelling.   Gastrointestinal:  Negative for abdominal pain, diarrhea, nausea and vomiting.   Neurological:  Negative for excessive daytime sleepiness, dizziness, focal weakness, light-headedness, numbness and weakness.   Psychiatric/Behavioral:  Negative for depression. The patient does not have insomnia.            Current Outpatient Medications:     amLODIPine (NORVASC) 10 MG tablet, Take 1 tablet by mouth Daily., Disp: , Rfl:     atorvastatin (LIPITOR) 20 MG tablet, Take 2 tablets by mouth Daily., Disp: , Rfl:     glimepiride (AMARYL) 4 MG tablet, Take 1 tablet by mouth Every Morning Before Breakfast., Disp: , Rfl:     lisinopril (PRINIVIL,ZESTRIL) 10 MG tablet, Take 1 tablet by mouth Every Night., Disp: , Rfl:     metFORMIN (GLUCOPHAGE) 500 MG tablet, Take 1 tablet by mouth 2 (Two) Times a Day With Meals., Disp: , Rfl:     SITagliptin (JANUVIA) 50 MG tablet, Take 1 tablet by mouth Daily. (Patient taking differently: Take 2 tablets by mouth Daily.), Disp: , Rfl:        Objective:      Vitals:    01/22/25 0839   BP: 154/72   Pulse: 66   SpO2: 99%     Vitals and nursing note reviewed.   Constitutional:       Appearance: Normal and healthy appearance. Well-developed and not in distress.   Eyes:      Extraocular Movements: Extraocular movements intact.      Pupils: Pupils are equal, round, and reactive to light.   HENT:      Head: Normocephalic and atraumatic.    Mouth/Throat:      Pharynx: Oropharynx is clear.   Neck:      Vascular: JVD normal.      Trachea: Trachea normal.   Pulmonary:      Effort: Pulmonary effort is normal.      Breath sounds: Normal breath sounds. No wheezing. No rhonchi. No rales.   Cardiovascular:      PMI at left midclavicular line. Normal rate. Regular rhythm. Normal S1. Normal S2.       Murmurs: There is a grade 2/6 systolic murmur.      No gallop.  No click. No rub.   Pulses:      Dorsalis pedis: 2+ bilaterally.     Posterior tibial: 2+ bilaterally.  Abdominal:      General: Bowel sounds are normal.      Palpations: Abdomen is soft.      Tenderness: There is no abdominal tenderness.   Musculoskeletal: Normal range of motion.      Cervical back: Normal range of motion and neck supple. Skin:     General: Skin is warm and dry.      Capillary Refill: Capillary refill takes less than 2 seconds.   Feet:      Right foot:      Skin integrity: Skin integrity normal.      Left foot:      Skin integrity: Skin integrity normal.   Neurological:      Mental Status: Alert and oriented to person, place and time.      Sensory: Sensation is intact.      Motor: Motor function is intact.      Coordination: Coordination is intact.   Psychiatric:         Speech: Speech normal.         Behavior: Behavior is cooperative.       Physical Exam      Lab Review:         ECG 12 Lead    Date/Time: 2/16/2025 2:14 PM  Performed by: Jamal Walker DO    Authorized by: Jamal Walker DO  Comparison: compared with previous ECG   Similar to previous ECG    Clinical impression: abnormal EKG        Results  Imaging  Stress test showed good heart function with no signs of blockage. Ultrasound showed strong heart with a squeezing function of 66 to 70%, normal diastolic function, and all valves opening and closing properly.    Assessment/Plan:     Problem List Items Addressed This Visit       Chronic dyspnea - Primary    Primary hypertension    Mixed hyperlipidemia    Former smoker     Assessment & Plan  1. Dyspnea.  His cardiac function is robust, as evidenced by normal stress test and ultrasound results. 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 A CT scan of the heart and lungs will be ordered to rule out any underlying issues. Additionally, a referral to a pulmonologist will be made for a  comprehensive evaluation of his respiratory system.      Recommendations/plans:    Transcribed from ambient dictation for Jamal Walker DO by Jamal Walker DO.  02/16/25   14:13 CST    Patient or patient representative verbalized consent for the use of Ambient Listening during the visit with  Jamal Walker DO for chart documentation. 2/16/2025  14:13 CST

## 2025-02-24 ENCOUNTER — HOSPITAL ENCOUNTER (OUTPATIENT)
Dept: CT IMAGING | Facility: HOSPITAL | Age: 75
Discharge: HOME | End: 2025-02-24
Admitting: INTERNAL MEDICINE
Payer: MEDICARE

## 2025-02-24 ENCOUNTER — OFFICE VISIT (OUTPATIENT)
Dept: PULMONOLOGY | Facility: CLINIC | Age: 75
End: 2025-02-24
Payer: MEDICARE

## 2025-02-24 DIAGNOSIS — R06.09 CHRONIC DYSPNEA: ICD-10-CM

## 2025-02-24 PROCEDURE — 71250 CT THORAX DX C-: CPT

## 2025-02-24 NOTE — PROCEDURES
Spirometry with Diffusion Capacity & Lung Volumes    Performed by: Amy Servin, RRT  Authorized by: Hermelinda Walker DO     Pre Drug % Predicted    FVC: 85%   FEV1: 93%   FEF 25-75%: 123%   FEV1/FVC: 82%   T%   RV: 72%   DLCO: 91%   D/VAsb: 110%    Interpretation   Spirometry   Spirometry shows normal results. midflow is normal.  Review of FVL curve   Patient's effort is normal.   Lung Volume Measurements  Measurements show reduced lung volumes consistent with restriction.   Diffusion Capacity  The patient's diffusion capacity is normal.

## 2025-02-28 ENCOUNTER — TELEPHONE (OUTPATIENT)
Dept: PULMONOLOGY | Facility: CLINIC | Age: 75
End: 2025-02-28
Payer: MEDICARE

## 2025-02-28 DIAGNOSIS — R91.1 PULMONARY NODULE: Primary | ICD-10-CM

## 2025-02-28 NOTE — TELEPHONE ENCOUNTER
----- Message from Hermelinda Walker sent at 2/28/2025  2:50 PM CST -----  Please let the pt know his CT looked good other than a very small pulmonary nodule in the right lower lobe. Nodule are very common and usually benign, but I would recommend a follow up CT in 1 year, this had been ordered. Thank you.

## 2025-03-03 ENCOUNTER — OFFICE VISIT (OUTPATIENT)
Dept: PULMONOLOGY | Facility: CLINIC | Age: 75
End: 2025-03-03
Payer: MEDICARE

## 2025-03-03 VITALS
WEIGHT: 264 LBS | BODY MASS INDEX: 40.01 KG/M2 | OXYGEN SATURATION: 96 % | DIASTOLIC BLOOD PRESSURE: 60 MMHG | RESPIRATION RATE: 18 BRPM | HEART RATE: 64 BPM | SYSTOLIC BLOOD PRESSURE: 130 MMHG | HEIGHT: 68 IN

## 2025-03-03 DIAGNOSIS — J98.4 RESTRICTIVE LUNG DISEASE SECONDARY TO OBESITY: ICD-10-CM

## 2025-03-03 DIAGNOSIS — E66.9 RESTRICTIVE LUNG DISEASE SECONDARY TO OBESITY: ICD-10-CM

## 2025-03-03 DIAGNOSIS — Z87.891 FORMER SMOKER: ICD-10-CM

## 2025-03-03 DIAGNOSIS — R06.09 CHRONIC DYSPNEA: ICD-10-CM

## 2025-03-03 DIAGNOSIS — R05.3 CHRONIC COUGH: ICD-10-CM

## 2025-03-03 DIAGNOSIS — R91.1 PULMONARY NODULE: Primary | ICD-10-CM

## 2025-03-03 NOTE — LETTER
2025     Elie Blackman MD  [address] [place] [address]    Patient: Amor Corbin   YOB: 1950   Date of Visit: 3/3/2025     Dear Dr. Yehuda MD:    Thank you for referring Amor Corbin to me for evaluation. Below are the relevant portions of my assessment and plan of care.    If you have questions, please do not hesitate to call me. I look forward to following Amor along with you.         Sincerely,        Livan Walker,         CC: No Recipients      Progress Notes:    Clinic Note - Follow Up            NAME: Amor Corbin  MRN: [id number]  AGE: 74 y.o.            : 1950  PRIMARY CARE PROVIDER: Elie Blackman MD               Reason for Visit:  Amor Corbin presents to clinic today for follow up for the evaluation of chronic dyspnea.    History of Present Illness:  Mr. Corbin is a 74-year-old male who presents to clinic today for follow-up for evaluation of chronic dyspnea. Past medical history includes hypertension, pulmonary nodules, diabetes, minimal smoking history.     The patient was last seen as a new patient on 2025.  Since that visit the patient has undergone chest CT and PFTs.  We reviewed the results of these test in clinic today.  I explained his PFTs showed no obstructive pathology.  His PFTs did have restrictive pathology with a normal DLCO.  We reviewed his chest CT which showed a small right lower lobe pulmonary nodule which will need a follow-up CT in 1 year.  Today the patient states he continues to experience dyspnea worse with exertion.  He does note correlation with the increased dyspnea and recent weight gain.    Review of Systems:  A full 12-point review of systems was performed and was negative except as documented in the HPI.            Physical Exam:  General: No acute distress, cooperative.  Head: Atraumatic, normocephalic, normal inspection.  Eyes: EOMI, normal inspection.  ENT: Mucous membranes  moist, normal inspection. No thrush.  Heart: RRR, no murmur  Lungs: Clear to auscultation, no wheezing  MSK: No edema or clubbing  GI/abdominal: Soft, nontender.  Neurologic: Alert, oriented.  Cranial nerves II through XII grossly intact.      Imaging Review:  I personally reviewed the chest CT done 2/24/2025:  CT showed a 4 mm right lower lobe pulmonary nodule.  Bibasilar atelectasis.      Pulmonary Functions Testing Results:              Problem List:  Problem List Items Addressed This Visit       Chronic dyspnea    Chronic cough    Former smoker    Restrictive lung disease secondary to obesity    Pulmonary nodule - Primary         Pulmonary Assessment:  Patient is a 74-year-old male being evaluated for chronic dyspnea.  He had a negative cardiac workup.  Much of the pulmonary workup was nonrevealing.  His only abnormality was a small pulmonary nodule on the CT which will need follow-up.  His PFTs are reassuring but do have a restrictive pattern with normal DLCO, most concerning for obesity related pulmonary restriction.      Plan:   -Follow-up CT in 1 year from the previous, this has been ordered  -Counseled on diet and weight loss           Follow Up:  1 year to follow-up on the CT         Thank you Elie Blackman MD for allowing me to participate in this patient's care.           Past Medical History:   Past Medical History:   Diagnosis Date   • Diabetes mellitus    • Diverticulitis     2022   • Hyperlipidemia    • Hypertension          Past Surgical History:   Past Surgical History:   Procedure Laterality Date   • COLONOSCOPY N/A 10/25/2023    Procedure: COLONOSCOPY WITH ANESTHESIA;  Surgeon: Brian Bray DO;  Location: [facility] ENDOSCOPY;  Service: Gastroenterology;  Laterality: N/A;  preop: screening  post op: diverticulosis; polyp  PCP: Elie Blackman MD   • COLONOSCOPY N/A 11/06/2023    Procedure: COLONOSCOPY WITH ANESTHESIA;  Surgeon: Brian Bray DO;  Location:   "[facility] ENDOSCOPY;  Service: Gastroenterology;  Laterality: N/A;  preop; hx of polyps  postop diverticulosis ; polyps   [facility] garrick gonzalez   • OTHER SURGICAL HISTORY      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 Authorizing Provider   amLODIPine (NORVASC) 10 MG tablet Take 1 tablet by mouth Daily.   Yes Yohannes Robbins MD   atorvastatin (LIPITOR) 20 MG tablet Take 2 tablets by mouth Daily.   Yes Yohannes Robbins MD   glimepiride (AMARYL) 4 MG tablet Take 1 tablet by mouth Every Morning Before Breakfast.   Yes Yohannes Robbins MD   lisinopril (PRINIVIL,ZESTRIL) 10 MG tablet Take 1 tablet by mouth Every Night.  Patient taking differently: Take 4 tablets by mouth Every Night.   Yes Yohannes Robbins MD   metFORMIN (GLUCOPHAGE) 500 MG tablet Take 1 tablet by mouth 2 (Two) Times a Day With Meals.   Yes Yohannes Robbins MD   SITagliptin (JANUVIA) 50 MG tablet Take 1 tablet by mouth Daily.  Patient taking differently: Take 2 tablets by mouth Daily.   Yes Yohannes Robbins MD         Allergies:   Penicillins      Results Review:             Visit Vitals  /60   Pulse 64   Resp 18   Ht 172.7 cm (67.99\")   Wt 120 kg (264 lb)   SpO2 96%   BMI 40.15 kg/m²           Social History:     Social History     Socioeconomic History   • Marital status:    Tobacco Use   • Smoking status: Former     Current packs/day: 0.00     Types: Cigarettes     Quit date: 1975     Years since quittin.1     Passive exposure: Past   • Smokeless tobacco: Never   • Tobacco comments:     Quit smoking over 40 years ago   Vaping Use   • Vaping status: " Never Used   Substance and Sexual Activity   • Alcohol use: Never   • Drug use: Never   • Sexual activity: Defer                Hermelinda Walker DO  Pulmonary/Critical Care  3/5/2025  12:41 CST

## 2025-03-05 PROBLEM — R91.1 PULMONARY NODULE: Status: ACTIVE | Noted: 2025-03-05

## 2025-03-05 PROBLEM — J98.4 RESTRICTIVE LUNG DISEASE SECONDARY TO OBESITY: Status: ACTIVE | Noted: 2025-03-05

## 2025-03-05 PROBLEM — E66.9 RESTRICTIVE LUNG DISEASE SECONDARY TO OBESITY: Status: ACTIVE | Noted: 2025-03-05

## 2025-03-05 NOTE — PROGRESS NOTES
Clinic Note - Follow Up            NAME: Amor Corbin  MRN: [id number]  AGE: 74 y.o.            : 1950  PRIMARY CARE PROVIDER: Elie Blackman MD               Reason for Visit:  Amor Corbin presents to clinic today for follow up for the evaluation of chronic dyspnea.    History of Present Illness:  Mr. Corbin is a 74-year-old male who presents to clinic today for follow-up for evaluation of chronic dyspnea. Past medical history includes hypertension, pulmonary nodules, diabetes, minimal smoking history.     The patient was last seen as a new patient on 2025.  Since that visit the patient has undergone chest CT and PFTs.  We reviewed the results of these test in clinic today.  I explained his PFTs showed no obstructive pathology.  His PFTs did have restrictive pathology with a normal DLCO.  We reviewed his chest CT which showed a small right lower lobe pulmonary nodule which will need a follow-up CT in 1 year.  Today the patient states he continues to experience dyspnea worse with exertion.  He does note correlation with the increased dyspnea and recent weight gain.    Review of Systems:  A full 12-point review of systems was performed and was negative except as documented in the HPI.            Physical Exam:  General: No acute distress, cooperative.  Head: Atraumatic, normocephalic, normal inspection.  Eyes: EOMI, normal inspection.  ENT: Mucous membranes moist, normal inspection. No thrush.  Heart: RRR, no murmur  Lungs: Clear to auscultation, no wheezing  MSK: No edema or clubbing  GI/abdominal: Soft, nontender.  Neurologic: Alert, oriented.  Cranial nerves II through XII grossly intact.      Imaging Review:  I personally reviewed the chest CT done 2025:  CT showed a 4 mm right lower lobe pulmonary nodule.  Bibasilar atelectasis.      Pulmonary Functions Testing Results:              Problem List:  Problem List Items Addressed This Visit       Chronic dyspnea    Chronic cough     Former smoker    Restrictive lung disease secondary to obesity    Pulmonary nodule - Primary         Pulmonary Assessment:  Patient is a 74-year-old male being evaluated for chronic dyspnea.  He had a negative cardiac workup.  Much of the pulmonary workup was nonrevealing.  His only abnormality was a small pulmonary nodule on the CT which will need follow-up.  His PFTs are reassuring but do have a restrictive pattern with normal DLCO, most concerning for obesity related pulmonary restriction.      Plan:   -Follow-up CT in 1 year from the previous, this has been ordered  -Counseled on diet and weight loss           Follow Up:  1 year to follow-up on the CT         Thank you Garrick Blackman MD for allowing me to participate in this patient's care.           Past Medical History:   Past Medical History:   Diagnosis Date    Diabetes mellitus     Diverticulitis     2022    Hyperlipidemia     Hypertension          Past Surgical History:   Past Surgical History:   Procedure Laterality Date    COLONOSCOPY N/A 10/25/2023    Procedure: COLONOSCOPY WITH ANESTHESIA;  Surgeon: Brian Bray DO;  Location: [facility] ENDOSCOPY;  Service: Gastroenterology;  Laterality: N/A;  preop: screening  post op: diverticulosis; polyp  PCP: Garrick Blackman MD    COLONOSCOPY N/A 11/06/2023    Procedure: COLONOSCOPY WITH ANESTHESIA;  Surgeon: Brian Bray DO;  Location: [facility] ENDOSCOPY;  Service: Gastroenterology;  Laterality: N/A;  preop; hx of polyps  postop diverticulosis ; polyps   pcpc garrick blackman    OTHER SURGICAL HISTORY      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 Authorizing Provider   amLODIPine (NORVASC) 10 MG tablet Take 1 tablet by mouth Daily.   Yes Yohannes Robbins MD   atorvastatin (LIPITOR) 20 MG tablet Take 2 tablets by mouth Daily.   Yes ProviderYohannes MD   glimepiride (AMARYL) 4 MG tablet Take 1 tablet by mouth Every Morning Before Breakfast.   Yes Yohannes Robbins MD   lisinopril (PRINIVIL,ZESTRIL) 10 MG tablet Take 1 tablet by mouth Every Night.  Patient taking differently: Take 4 tablets by mouth Every Night.   Yes Yohannes Robbins MD   metFORMIN (GLUCOPHAGE) 500 MG tablet Take 1 tablet by mouth 2 (Two) Times a Day With Meals.   Yes Yohannes Robbins MD   SITagliptin (JANUVIA) 50 MG tablet Take 1 tablet by mouth Daily.  Patient taking differently: Take 2 tablets by mouth Daily.   Yes Yohannes Robbins MD         Allergies:   Penicillins      Results Review:             Visit Vitals  /60   Pulse 64   Resp 18   Ht 172.7 cm (67.99\")   Wt 120 kg (264 lb)   SpO2 96%   BMI 40.15 kg/m²           Social History:     Social History     Socioeconomic History    Marital status:    Tobacco Use    Smoking status: Former     Current packs/day: 0.00     Types: Cigarettes     Quit date: 1975     Years since quittin.1     Passive exposure: Past    Smokeless tobacco: Never    Tobacco comments:     Quit smoking over 40 years ago   Vaping Use    Vaping status: Never Used   Substance and Sexual Activity    Alcohol use: Never    Drug use: Never    Sexual activity: Jw Walker DO  Pulmonary/Critical Care  3/5/2025  12:41 CST  "

## 2025-03-18 ENCOUNTER — TELEPHONE (OUTPATIENT)
Dept: PULMONOLOGY | Facility: CLINIC | Age: 75
End: 2025-03-18
Payer: MEDICARE

## 2025-03-18 NOTE — TELEPHONE ENCOUNTER
Spoke with patient regarding a script be sent in for Trelegy medication to the pharmacy since it was dicussed on last office visit. He was given samples at last visit.   Provider sent on medication to pharmacy, patient was advised to call us back if not able to get medication .

## 2025-08-18 ENCOUNTER — OFFICE VISIT (OUTPATIENT)
Dept: CARDIOLOGY | Facility: CLINIC | Age: 75
End: 2025-08-18
Payer: MEDICARE

## 2025-08-18 VITALS
BODY MASS INDEX: 43 KG/M2 | OXYGEN SATURATION: 94 % | DIASTOLIC BLOOD PRESSURE: 78 MMHG | SYSTOLIC BLOOD PRESSURE: 135 MMHG | WEIGHT: 274 LBS | HEIGHT: 67 IN | RESPIRATION RATE: 18 BRPM | HEART RATE: 75 BPM

## 2025-08-18 DIAGNOSIS — I10 PRIMARY HYPERTENSION: Primary | ICD-10-CM

## 2025-08-18 DIAGNOSIS — R06.02 SHORTNESS OF BREATH: ICD-10-CM

## 2025-08-18 PROCEDURE — 3075F SYST BP GE 130 - 139MM HG: CPT | Performed by: NURSE PRACTITIONER

## 2025-08-18 PROCEDURE — 99214 OFFICE O/P EST MOD 30 MIN: CPT | Performed by: NURSE PRACTITIONER

## 2025-08-18 PROCEDURE — 3078F DIAST BP <80 MM HG: CPT | Performed by: NURSE PRACTITIONER

## 2025-08-18 RX ORDER — LISINOPRIL 40 MG/1
40 TABLET ORAL DAILY
COMMUNITY

## 2025-08-18 RX ORDER — FLUTICASONE PROPIONATE 50 MCG
2 SPRAY, SUSPENSION (ML) NASAL DAILY
COMMUNITY

## (undated) DEVICE — SNAR POLYP CAPTIVATOR MICROHEX 13 240CM

## (undated) DEVICE — THE EXACTO COLD SNARE IS INTENDED TO BE USED WITHOUT DIATHERMIC ENERGY FOR THE ENDOSCOPIC RESECTION OF POLYP TISSUE IN THE GASTROINTESTINAL TRACT.: Brand: EXACTO

## (undated) DEVICE — MASK,OXYGEN,MED CONC,ADLT,7' TUB, UC: Brand: PENDING

## (undated) DEVICE — SENSR O2 OXIMAX FNGR A/ 18IN NONSTR

## (undated) DEVICE — FRCP BX RADJAW4 NDL 2.8 240 STD OG

## (undated) DEVICE — THE CHANNEL CLEANING BRUSH IS A NYLON FLEXI BRUSH ATTACHED TO A FLEXIBLE PLASTIC SHEATH DESIGNED TO SAFELY REMOVE DEBRIS FROM FLEXIBLE ENDOSCOPES.

## (undated) DEVICE — THE SINGLE USE ETRAP – POLYP TRAP IS USED FOR SUCTION RETRIEVAL OF ENDOSCOPICALLY REMOVED POLYPS.: Brand: ETRAP

## (undated) DEVICE — Device: Brand: BLACK EYE

## (undated) DEVICE — Device: Brand: DEFENDO AIR/WATER/SUCTION AND BIOPSY VALVE

## (undated) DEVICE — YANKAUER,BULB TIP WITH VENT: Brand: ARGYLE

## (undated) DEVICE — NDL SCLEROTHRPY INTERJECT 25G 4 240 CLR